# Patient Record
Sex: MALE | Race: WHITE
[De-identification: names, ages, dates, MRNs, and addresses within clinical notes are randomized per-mention and may not be internally consistent; named-entity substitution may affect disease eponyms.]

---

## 2018-10-25 ENCOUNTER — HOSPITAL ENCOUNTER (INPATIENT)
Dept: HOSPITAL 74 - YASAS | Age: 28
LOS: 5 days | Discharge: OTHER INSTITUTION | DRG: 773 | End: 2018-10-30
Attending: INTERNAL MEDICINE | Admitting: INTERNAL MEDICINE
Payer: COMMERCIAL

## 2018-10-25 VITALS — BODY MASS INDEX: 26.7 KG/M2

## 2018-10-25 DIAGNOSIS — F13.230: Primary | ICD-10-CM

## 2018-10-25 DIAGNOSIS — J44.9: ICD-10-CM

## 2018-10-25 DIAGNOSIS — R56.9: ICD-10-CM

## 2018-10-25 DIAGNOSIS — R82.90: ICD-10-CM

## 2018-10-25 DIAGNOSIS — F17.210: ICD-10-CM

## 2018-10-25 DIAGNOSIS — I95.9: ICD-10-CM

## 2018-10-25 DIAGNOSIS — F11.20: ICD-10-CM

## 2018-10-25 DIAGNOSIS — F41.9: ICD-10-CM

## 2018-10-25 LAB
APPEARANCE UR: (no result)
BILIRUB UR STRIP.AUTO-MCNC: NEGATIVE MG/DL
CAOX CRY URNS QL MICRO: (no result) /HPF
COLOR UR: (no result)
EPITH CASTS URNS QL MICRO: (no result) /HPF
HYALINE CASTS URNS QL MICRO: 4 /LPF
KETONES UR QL STRIP: (no result)
LEUKOCYTE ESTERASE UR QL STRIP.AUTO: (no result)
MUCOUS THREADS URNS QL MICRO: (no result)
NITRITE UR QL STRIP: NEGATIVE
PH UR: 5 [PH] (ref 5–8)
PROT UR QL STRIP: (no result)
PROT UR QL STRIP: NEGATIVE
SP GR UR: 1.02 (ref 1.01–1.03)
UROBILINOGEN UR STRIP-MCNC: 2 MG/DL (ref 0.2–1)

## 2018-10-25 PROCEDURE — HZ2ZZZZ DETOXIFICATION SERVICES FOR SUBSTANCE ABUSE TREATMENT: ICD-10-PCS | Performed by: INTERNAL MEDICINE

## 2018-10-25 RX ADMIN — Medication PRN MG: at 22:14

## 2018-10-25 RX ADMIN — Medication SCH MG: at 22:14
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
## 2018-10-26 LAB
ALBUMIN SERPL-MCNC: 3.2 G/DL (ref 3.4–5)
ALP SERPL-CCNC: 67 U/L (ref 45–117)
ALT SERPL-CCNC: 28 U/L (ref 13–61)
ANION GAP SERPL CALC-SCNC: 4 MMOL/L (ref 8–16)
AST SERPL-CCNC: 21 U/L (ref 15–37)
BILIRUB SERPL-MCNC: 0.2 MG/DL (ref 0.2–1)
BUN SERPL-MCNC: 7 MG/DL (ref 7–18)
CALCIUM SERPL-MCNC: 8.6 MG/DL (ref 8.5–10.1)
CHLORIDE SERPL-SCNC: 105 MMOL/L (ref 98–107)
CO2 SERPL-SCNC: 33 MMOL/L (ref 21–32)
CREAT SERPL-MCNC: 0.8 MG/DL (ref 0.55–1.3)
DEPRECATED RDW RBC AUTO: 13.7 % (ref 11.9–15.9)
GLUCOSE SERPL-MCNC: 89 MG/DL (ref 74–106)
HCT VFR BLD CALC: 39.9 % (ref 35.4–49)
HGB BLD-MCNC: 13.6 GM/DL (ref 11.7–16.9)
MCH RBC QN AUTO: 29.2 PG (ref 25.7–33.7)
MCHC RBC AUTO-ENTMCNC: 34.2 G/DL (ref 32–35.9)
MCV RBC: 85.4 FL (ref 80–96)
PLATELET # BLD AUTO: 174 K/MM3 (ref 134–434)
PMV BLD: 10.8 FL (ref 7.5–11.1)
POTASSIUM SERPLBLD-SCNC: 4 MMOL/L (ref 3.5–5.1)
PROT SERPL-MCNC: 6.2 G/DL (ref 6.4–8.2)
RBC # BLD AUTO: 4.67 M/MM3 (ref 4–5.6)
SODIUM SERPL-SCNC: 142 MMOL/L (ref 136–145)
WBC # BLD AUTO: 5.1 K/MM3 (ref 4–10)

## 2018-10-26 RX ADMIN — Medication SCH MG: at 22:16

## 2018-10-26 RX ADMIN — Medication SCH TAB: at 10:08

## 2018-10-26 RX ADMIN — METHADONE HYDROCHLORIDE SCH MG: 40 TABLET ORAL at 08:58

## 2018-10-26 RX ADMIN — Medication PRN MG: at 22:17

## 2018-10-26 NOTE — PN
[facility] CIWA





- CIWA Score


Nausea/Vomiting: 3


Muscle Tremors: 4-Moderate,w/Arms Extend


Anxiety: 4-Mod. Anxious/Guarded


Agitation: 3


Paroxysmal Sweats: 3


Orientation: 0-Oriented


Tacttile Disturbances: 0-None


Auditory Disturbances: 0-None


Visual Disturbances: 0-None


Headache: 0-None Present


CIWA-Ar Total Score: 17





BHS Progress Note (SOAP)


Subjective: 





Sweating, irritable, interrupted sleep


Objective: 





10/26/18 14:54


 Last Vital Signs











Temp Pulse Resp BP Pulse Ox


 


 97.7 F   68   18   94/61    


 


 10/26/18 13:57  10/26/18 13:57  10/26/18 13:57  10/26/18 13:57   








Hypotension noted





 Laboratory Tests











  10/25/18 10/26/18 10/26/18





  16:09 07:00 07:00


 


WBC   5.1 


 


RBC   4.67 


 


Hgb   13.6 


 


Hct   39.9 


 


MCV   85.4 


 


MCH   29.2 


 


MCHC   34.2 


 


RDW   13.7 


 


Plt Count   174 


 


MPV   10.8 


 


Sodium    142


 


Potassium    4.0


 


Chloride    105


 


Carbon Dioxide    33 H


 


Anion Gap    4 L


 


BUN    7


 


Creatinine    0.8


 


Creat Clearance w eGFR    > 60


 


Random Glucose    89


 


Calcium    8.6


 


Total Bilirubin    0.2


 


AST    21


 


ALT    28


 


Alkaline Phosphatase    67


 


Total Protein    6.2 L


 


Albumin    3.2 L


 


Urine Color  Cristy  


 


Urine Appearance  Slcloudy  


 


Urine pH  5.0  


 


Ur Specific Gravity  1.025  


 


Urine Protein  1+ H  


 


Urine Glucose (UA)  Negative  


 


Urine Ketones  Trace H  


 


Urine Blood  Negative  


 


Urine Nitrite  Negative  


 


Urine Bilirubin  Negative  


 


Urine Urobilinogen  2.0  


 


Ur Leukocyte Esterase  Trace  


 


Urine WBC (Auto)  None  


 


Urine RBC (Auto)  None  


 


Ur Epithelial Cells  Rare  


 


Calcium Oxalate Crystal  Few  


 


Hyaline Casts  4  


 


Urine Mucus  Many  


 


RPR Titer   














  10/26/18





  07:00


 


WBC 


 


RBC 


 


Hgb 


 


Hct 


 


MCV 


 


MCH 


 


MCHC 


 


RDW 


 


Plt Count 


 


MPV 


 


Sodium 


 


Potassium 


 


Chloride 


 


Carbon Dioxide 


 


Anion Gap 


 


BUN 


 


Creatinine 


 


Creat Clearance w eGFR 


 


Random Glucose 


 


Calcium 


 


Total Bilirubin 


 


AST 


 


ALT 


 


Alkaline Phosphatase 


 


Total Protein 


 


Albumin 


 


Urine Color 


 


Urine Appearance 


 


Urine pH 


 


Ur Specific Gravity 


 


Urine Protein 


 


Urine Glucose (UA) 


 


Urine Ketones 


 


Urine Blood 


 


Urine Nitrite 


 


Urine Bilirubin 


 


Urine Urobilinogen 


 


Ur Leukocyte Esterase 


 


Urine WBC (Auto) 


 


Urine RBC (Auto) 


 


Ur Epithelial Cells 


 


Calcium Oxalate Crystal 


 


Hyaline Casts 


 


Urine Mucus 


 


RPR Titer  Nonreactive








Labs reviewed: abnormal UA


Assessment: 





10/26/18 14:56


Withdrawal symptoms





Noted with hypotension and abnormal UA


Plan: 





Continue detox





Hypotension: asymptomatic, encouraged PO water intake





Abnormal UA: encouraged PO water intake, repeat UA

## 2018-10-27 RX ADMIN — Medication SCH MG: at 22:21

## 2018-10-27 RX ADMIN — Medication SCH TAB: at 10:31

## 2018-10-27 RX ADMIN — Medication PRN MG: at 22:23

## 2018-10-27 RX ADMIN — METHADONE HYDROCHLORIDE SCH MG: 40 TABLET ORAL at 05:59

## 2018-10-27 NOTE — PN
[facility] CIWA





- CIWA Score


Nausea/Vomitin-No Nausea/No Vomiting


Muscle Tremors: 2


Anxiety: 2


Agitation: 3


Paroxysmal Sweats: 2


Orientation: 0-Oriented


Tacttile Disturbances: 2-Mild Itch/Numbness/Burn


Auditory Disturbances: 0-None


Visual Disturbances: 2-Mild Sensitivity


Headache: 0-None Present


CIWA-Ar Total Score: 13





BHS Progress Note (SOAP)


Subjective: 





c/o constipation, sweats, chills, foot pain, sensitivity to light 


Objective: 





10/27/18 14:33


 Vital Signs











Temperature  98.3 F   10/27/18 14:07


 


Pulse Rate  76   10/27/18 14:07


 


Respiratory Rate  20   10/27/18 14:07


 


Blood Pressure  102/64   10/27/18 14:07


 


O2 Sat by Pulse Oximetry (%)      








 Laboratory Last Values











WBC  5.1 K/mm3 (4.0-10.0)   10/26/18  07:00    


 


RBC  4.67 M/mm3 (4.00-5.60)   10/26/18  07:00    


 


Hgb  13.6 GM/dL (11.7-16.9)   10/26/18  07:00    


 


Hct  39.9 % (35.4-49)   10/26/18  07:00    


 


MCV  85.4 fl (80-96)   10/26/18  07:00    


 


MCH  29.2 pg (25.7-33.7)   10/26/18  07:00    


 


MCHC  34.2 g/dl (32.0-35.9)   10/26/18  07:00    


 


RDW  13.7 % (11.9-15.9)   10/26/18  07:00    


 


Plt Count  174 K/MM3 (134-434)   10/26/18  07:00    


 


MPV  10.8 fl (7.5-11.1)   10/26/18  07:00    


 


Sodium  142 mmol/L (136-145)   10/26/18  07:00    


 


Potassium  4.0 mmol/L (3.5-5.1)   10/26/18  07:00    


 


Chloride  105 mmol/L ()   10/26/18  07:00    


 


Carbon Dioxide  33 mmol/L (21-32)  H  10/26/18  07:00    


 


Anion Gap  4 MMOL/L (8-16)  L  10/26/18  07:00    


 


BUN  7 mg/dL (7-18)   10/26/18  07:00    


 


Creatinine  0.8 mg/dL (0.55-1.3)   10/26/18  07:00    


 


Creat Clearance w eGFR  > 60  (>60)   10/26/18  07:00    


 


Random Glucose  89 mg/dL ()   10/26/18  07:00    


 


Calcium  8.6 mg/dL (8.5-10.1)   10/26/18  07:00    


 


Total Bilirubin  0.2 mg/dL (0.2-1)   10/26/18  07:00    


 


AST  21 U/L (15-37)   10/26/18  07:00    


 


ALT  28 U/L (13-61)   10/26/18  07:00    


 


Alkaline Phosphatase  67 U/L ()   10/26/18  07:00    


 


Total Protein  6.2 g/dl (6.4-8.2)  L  10/26/18  07:00    


 


Albumin  3.2 g/dl (3.4-5.0)  L  10/26/18  07:00    


 


Urine Color  Cristy   10/25/18  16:09    


 


Urine Appearance  Slcloudy   10/25/18  16:09    


 


Urine pH  5.0  (5.0-8.0)   10/25/18  16:09    


 


Ur Specific Gravity  1.025  (1.010-1.035)   10/25/18  16:09    


 


Urine Protein  1+  (NEGATIVE)  H  10/25/18  16:09    


 


Urine Glucose (UA)  Negative  (NEGATIVE)   10/25/18  16:09    


 


Urine Ketones  Trace  (NEGATIVE)  H  10/25/18  16:09    


 


Urine Blood  Negative  (NEGATIVE)   10/25/18  16:09    


 


Urine Nitrite  Negative  (NEGATIVE)   10/25/18  16:09    


 


Urine Bilirubin  Negative  (<2.0 mg/dL)   10/25/18  16:09    


 


Urine Urobilinogen  2.0 mg/dL (0.2-1.0)   10/25/18  16:09    


 


Ur Leukocyte Esterase  Trace  (NEGATIVE)   10/25/18  16:09    


 


Urine WBC (Auto)  None /hpf (3-5)   10/25/18  16:09    


 


Urine RBC (Auto)  None /hpf (0-3)   10/25/18  16:09    


 


Ur Epithelial Cells  Rare /HPF (FEW)   10/25/18  16:09    


 


Calcium Oxalate Crystal  Few /hpf (NONE SEEN)   10/25/18  16:09    


 


Hyaline Casts  4 /lpf  10/25/18  16:09    


 


Urine Mucus  Many   10/25/18  16:09    


 


RPR Titer  Nonreactive  (NONREACTIVE)   10/26/18  07:00    








Ao x 3 no distress


no adventitious breath sounds 


full ROM ambulating in the unit 





Assessment: 





10/27/18 14:34


withdrawal sx 


Plan: 





increase fluids 


continue detox


continue to monitor

## 2018-10-27 NOTE — PN
BHS Progress Note


Note: 





positive ppd,ches xray ordered on Mon 10/29/18 at 0900 before discharge

## 2018-10-28 RX ADMIN — Medication SCH: at 22:09

## 2018-10-28 RX ADMIN — Medication SCH: at 10:24

## 2018-10-28 RX ADMIN — Medication PRN MG: at 22:09

## 2018-10-28 RX ADMIN — METHADONE HYDROCHLORIDE SCH MG: 40 TABLET ORAL at 05:55

## 2018-10-28 NOTE — PN
BHS Progress Note (SOAP)


Subjective: 





Sweating, interrupted sleep


Objective: 





10/28/18 16:30


 Last Vital Signs











Temp Pulse Resp BP Pulse Ox


 


 98.0 F   69   16   105/70    


 


 10/28/18 14:18  10/28/18 14:18  10/28/18 14:18  10/28/18 14:18   








 Laboratory Tests











  10/25/18 10/26/18 10/26/18





  16:09 07:00 07:00


 


WBC   5.1 


 


RBC   4.67 


 


Hgb   13.6 


 


Hct   39.9 


 


MCV   85.4 


 


MCH   29.2 


 


MCHC   34.2 


 


RDW   13.7 


 


Plt Count   174 


 


MPV   10.8 


 


Sodium    142


 


Potassium    4.0


 


Chloride    105


 


Carbon Dioxide    33 H


 


Anion Gap    4 L


 


BUN    7


 


Creatinine    0.8


 


Creat Clearance w eGFR    > 60


 


Random Glucose    89


 


Calcium    8.6


 


Total Bilirubin    0.2


 


AST    21


 


ALT    28


 


Alkaline Phosphatase    67


 


Total Protein    6.2 L


 


Albumin    3.2 L


 


Urine Color  Cristy  


 


Urine Appearance  Slcloudy  


 


Urine pH  5.0  


 


Ur Specific Gravity  1.025  


 


Urine Protein  1+ H  


 


Urine Glucose (UA)  Negative  


 


Urine Ketones  Trace H  


 


Urine Blood  Negative  


 


Urine Nitrite  Negative  


 


Urine Bilirubin  Negative  


 


Urine Urobilinogen  2.0  


 


Ur Leukocyte Esterase  Trace  


 


Urine WBC (Auto)  None  


 


Urine RBC (Auto)  None  


 


Ur Epithelial Cells  Rare  


 


Calcium Oxalate Crystal  Few  


 


Hyaline Casts  4  


 


Urine Mucus  Many  


 


RPR Titer   














  10/26/18





  07:00


 


WBC 


 


RBC 


 


Hgb 


 


Hct 


 


MCV 


 


MCH 


 


MCHC 


 


RDW 


 


Plt Count 


 


MPV 


 


Sodium 


 


Potassium 


 


Chloride 


 


Carbon Dioxide 


 


Anion Gap 


 


BUN 


 


Creatinine 


 


Creat Clearance w eGFR 


 


Random Glucose 


 


Calcium 


 


Total Bilirubin 


 


AST 


 


ALT 


 


Alkaline Phosphatase 


 


Total Protein 


 


Albumin 


 


Urine Color 


 


Urine Appearance 


 


Urine pH 


 


Ur Specific Gravity 


 


Urine Protein 


 


Urine Glucose (UA) 


 


Urine Ketones 


 


Urine Blood 


 


Urine Nitrite 


 


Urine Bilirubin 


 


Urine Urobilinogen 


 


Ur Leukocyte Esterase 


 


Urine WBC (Auto) 


 


Urine RBC (Auto) 


 


Ur Epithelial Cells 


 


Calcium Oxalate Crystal 


 


Hyaline Casts 


 


Urine Mucus 


 


RPR Titer  Nonreactive








Labs reviewed: abnormal UA (reordered UA for repeat but patient didn't provide 

specimen)


Assessment: 





10/28/18 16:31


Withdrawal symptoms


Plan: 





Continue detox


Encouraged PO water intake

## 2018-10-29 LAB
APPEARANCE UR: CLEAR
BILIRUB UR STRIP.AUTO-MCNC: NEGATIVE MG/DL
COLOR UR: (no result)
KETONES UR QL STRIP: NEGATIVE
LEUKOCYTE ESTERASE UR QL STRIP.AUTO: (no result)
NITRITE UR QL STRIP: NEGATIVE
PH UR: 6 [PH] (ref 5–8)
PROT UR QL STRIP: NEGATIVE
PROT UR QL STRIP: NEGATIVE
SP GR UR: 1.01 (ref 1.01–1.03)
UROBILINOGEN UR STRIP-MCNC: NEGATIVE MG/DL (ref 0.2–1)

## 2018-10-29 RX ADMIN — Medication PRN MG: at 22:16

## 2018-10-29 RX ADMIN — Medication SCH MG: at 22:16

## 2018-10-29 RX ADMIN — Medication SCH TAB: at 10:55

## 2018-10-29 RX ADMIN — METHADONE HYDROCHLORIDE SCH MG: 40 TABLET ORAL at 05:23

## 2018-10-29 NOTE — PN
BHS Progress Note (SOAP)


Subjective: 





Sweating, irritation to light and sound as per patient, interrupted sleep, poor 

appetite


Objective: 





10/29/18 14:35


 Last Vital Signs











Temp Pulse Resp BP Pulse Ox


 


 98 F   74   20   93/55 L   


 


 10/29/18 13:38  10/29/18 13:38  10/29/18 13:38  10/29/18 13:38   








Hypotension (b/p 93/55)





 Laboratory Tests











  10/25/18 10/26/18 10/26/18





  16:09 07:00 07:00


 


WBC   5.1 


 


RBC   4.67 


 


Hgb   13.6 


 


Hct   39.9 


 


MCV   85.4 


 


MCH   29.2 


 


MCHC   34.2 


 


RDW   13.7 


 


Plt Count   174 


 


MPV   10.8 


 


Sodium    142


 


Potassium    4.0


 


Chloride    105


 


Carbon Dioxide    33 H


 


Anion Gap    4 L


 


BUN    7


 


Creatinine    0.8


 


Creat Clearance w eGFR    > 60


 


Random Glucose    89


 


Calcium    8.6


 


Total Bilirubin    0.2


 


AST    21


 


ALT    28


 


Alkaline Phosphatase    67


 


Total Protein    6.2 L


 


Albumin    3.2 L


 


Urine Color  Cristy  


 


Urine Appearance  Slcloudy  


 


Urine pH  5.0  


 


Ur Specific Gravity  1.025  


 


Urine Protein  1+ H  


 


Urine Glucose (UA)  Negative  


 


Urine Ketones  Trace H  


 


Urine Blood  Negative  


 


Urine Nitrite  Negative  


 


Urine Bilirubin  Negative  


 


Urine Urobilinogen  2.0  


 


Ur Leukocyte Esterase  Trace  


 


Urine WBC (Auto)  None  


 


Urine RBC (Auto)  None  


 


Ur Epithelial Cells  Rare  


 


Calcium Oxalate Crystal  Few  


 


Hyaline Casts  4  


 


Urine Mucus  Many  


 


RPR Titer   














  10/26/18 10/28/18





  07:00 12:25


 


WBC  


 


RBC  


 


Hgb  


 


Hct  


 


MCV  


 


MCH  


 


MCHC  


 


RDW  


 


Plt Count  


 


MPV  


 


Sodium  


 


Potassium  


 


Chloride  


 


Carbon Dioxide  


 


Anion Gap  


 


BUN  


 


Creatinine  


 


Creat Clearance w eGFR  


 


Random Glucose  


 


Calcium  


 


Total Bilirubin  


 


AST  


 


ALT  


 


Alkaline Phosphatase  


 


Total Protein  


 


Albumin  


 


Urine Color   Ltyellow


 


Urine Appearance   Clear


 


Urine pH   6.0


 


Ur Specific Gravity   1.014


 


Urine Protein   Negative


 


Urine Glucose (UA)   Negative


 


Urine Ketones   Negative


 


Urine Blood   Negative


 


Urine Nitrite   Negative


 


Urine Bilirubin   Negative


 


Urine Urobilinogen   Negative


 


Ur Leukocyte Esterase   Trace


 


Urine WBC (Auto)   <1


 


Urine RBC (Auto)   <1


 


Ur Epithelial Cells  


 


Calcium Oxalate Crystal  


 


Hyaline Casts  


 


Urine Mucus  


 


RPR Titer  Nonreactive 








Labs reviewed


Assessment: 





10/29/18 14:36


Withdrawal symptoms





Noted with hypotension


Plan: 





Continue detox





Hypotension: asymptomatic, encouraged PO water intake

## 2018-10-30 ENCOUNTER — HOSPITAL ENCOUNTER (INPATIENT)
Dept: HOSPITAL 74 - YASAS | Age: 28
LOS: 2 days | Discharge: LEFT BEFORE DISCHARGE | DRG: 770 | End: 2018-11-01
Attending: PSYCHIATRY & NEUROLOGY | Admitting: PSYCHIATRY & NEUROLOGY
Payer: COMMERCIAL

## 2018-10-30 VITALS — TEMPERATURE: 96.6 F | SYSTOLIC BLOOD PRESSURE: 101 MMHG | DIASTOLIC BLOOD PRESSURE: 68 MMHG | HEART RATE: 82 BPM

## 2018-10-30 VITALS — BODY MASS INDEX: 27 KG/M2

## 2018-10-30 DIAGNOSIS — Z86.69: ICD-10-CM

## 2018-10-30 DIAGNOSIS — J44.9: ICD-10-CM

## 2018-10-30 DIAGNOSIS — F13.20: Primary | ICD-10-CM

## 2018-10-30 DIAGNOSIS — F19.280: ICD-10-CM

## 2018-10-30 DIAGNOSIS — F17.210: ICD-10-CM

## 2018-10-30 DIAGNOSIS — F19.282: ICD-10-CM

## 2018-10-30 DIAGNOSIS — F11.20: ICD-10-CM

## 2018-10-30 PROCEDURE — HZ42ZZZ GROUP COUNSELING FOR SUBSTANCE ABUSE TREATMENT, COGNITIVE-BEHAVIORAL: ICD-10-PCS | Performed by: PSYCHIATRY & NEUROLOGY

## 2018-10-30 RX ADMIN — Medication SCH MG: at 22:05

## 2018-10-30 RX ADMIN — Medication SCH TAB: at 10:27

## 2018-10-30 RX ADMIN — METHADONE HYDROCHLORIDE SCH MG: 40 TABLET ORAL at 05:39

## 2018-10-30 NOTE — DS
BHS Detox Discharge Summary


Admission Date: 


10/25/18





Discharge Date: 10/30/18





- History


Present History: Opioid Dependence, Sedative Dependence, [facility]


Additional Comments: 





Patient scheduled for discharge today. As per patient, he was told by his 

counselor that he will be admitted to [facility] rehab today (if bed available)

. Otherwise, patient will follow up at outpatient program. 


Pertinent Past History: 





Nicotine dependence





Benzo related seizure disorder





Sedative dependence





COPD





Anxiety





Opioid dependence on agonist 





- Physical Exam Results


Vital Signs: 


 Vital Signs











Temperature  97 F L  10/30/18 09:10


 


Pulse Rate  73   10/30/18 09:10


 


Respiratory Rate  20   10/30/18 09:10


 


Blood Pressure  108/64   10/30/18 09:10


 


O2 Sat by Pulse Oximetry (%)      











Pertinent Admission Physical Exam Findings: 





Withdrawal symptoms





 Laboratory Tests











  10/25/18 10/26/18 10/26/18





  16:09 07:00 07:00


 


WBC   5.1 


 


RBC   4.67 


 


Hgb   13.6 


 


Hct   39.9 


 


MCV   85.4 


 


MCH   29.2 


 


MCHC   34.2 


 


RDW   13.7 


 


Plt Count   174 


 


MPV   10.8 


 


Sodium    142


 


Potassium    4.0


 


Chloride    105


 


Carbon Dioxide    33 H


 


Anion Gap    4 L


 


BUN    7


 


Creatinine    0.8


 


Creat Clearance w eGFR    > 60


 


Random Glucose    89


 


Calcium    8.6


 


Total Bilirubin    0.2


 


AST    21


 


ALT    28


 


Alkaline Phosphatase    67


 


Total Protein    6.2 L


 


Albumin    3.2 L


 


Urine Color  Cristy  


 


Urine Appearance  Slcloudy  


 


Urine pH  5.0  


 


Ur Specific Gravity  1.025  


 


Urine Protein  1+ H  


 


Urine Glucose (UA)  Negative  


 


Urine Ketones  Trace H  


 


Urine Blood  Negative  


 


Urine Nitrite  Negative  


 


Urine Bilirubin  Negative  


 


Urine Urobilinogen  2.0  


 


Ur Leukocyte Esterase  Trace  


 


Urine WBC (Auto)  None  


 


Urine RBC (Auto)  None  


 


Ur Epithelial Cells  Rare  


 


Calcium Oxalate Crystal  Few  


 


Hyaline Casts  4  


 


Urine Mucus  Many  


 


RPR Titer   














  10/26/18 10/28/18





  07:00 12:25


 


WBC  


 


RBC  


 


Hgb  


 


Hct  


 


MCV  


 


MCH  


 


MCHC  


 


RDW  


 


Plt Count  


 


MPV  


 


Sodium  


 


Potassium  


 


Chloride  


 


Carbon Dioxide  


 


Anion Gap  


 


BUN  


 


Creatinine  


 


Creat Clearance w eGFR  


 


Random Glucose  


 


Calcium  


 


Total Bilirubin  


 


AST  


 


ALT  


 


Alkaline Phosphatase  


 


Total Protein  


 


Albumin  


 


Urine Color   Ltyellow


 


Urine Appearance   Clear


 


Urine pH   6.0


 


Ur Specific Gravity   1.014


 


Urine Protein   Negative


 


Urine Glucose (UA)   Negative


 


Urine Ketones   Negative


 


Urine Blood   Negative


 


Urine Nitrite   Negative


 


Urine Bilirubin   Negative


 


Urine Urobilinogen   Negative


 


Ur Leukocyte Esterase   Trace


 


Urine WBC (Auto)   <1


 


Urine RBC (Auto)   <1


 


Ur Epithelial Cells  


 


Calcium Oxalate Crystal  


 


Hyaline Casts  


 


Urine Mucus  


 


RPR Titer  Nonreactive 








Labs reviewed





- Treatment


Hospital Course: Detox Protocol Followed, Detoxed Safely, Responded well, 

Discharged Condition Good, Rehab Referral Accepted





- Medication


Discharge Medications: 


Ambulatory Orders





NK [No Known Home Medication]  10/25/18 











- Diagnosis


(1) Seizure


Current Visit: Yes   Status: Chronic   





(2) COPD (chronic obstructive pulmonary disease)


Current Visit: Yes   Status: Chronic   





(3) Anxiety


Current Visit: Yes   Status: Chronic   





(4) Nicotine dependence


Current Visit: Yes   Status: Chronic   





(5) Opioid dependence on agonist therapy


Current Visit: Yes   Status: Acute   





(6) Sedative withdrawal


Current Visit: Yes   Status: Acute   





- AMA


Did Patient Leave Against Medical Advice: No (F/U with your PCP within 1-2 weeks

)
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
## 2018-10-31 VITALS — TEMPERATURE: 97.8 F

## 2018-10-31 RX ADMIN — Medication SCH MG: at 21:16

## 2018-10-31 RX ADMIN — METHADONE HYDROCHLORIDE SCH MG: 40 TABLET ORAL at 06:09

## 2018-10-31 RX ADMIN — HYDROXYZINE PAMOATE PRN MG: 50 CAPSULE ORAL at 21:17

## 2018-10-31 RX ADMIN — Medication SCH: at 10:59

## 2018-10-31 NOTE — PN
[facility] Progress Note


Note: 





Patient complains about having restless leg after taking Belsomra yesterday at 

bedtime. Belsomra discontinued and Trazadone 100 mg po HS ordered

## 2018-11-01 VITALS — HEART RATE: 71 BPM | DIASTOLIC BLOOD PRESSURE: 59 MMHG | SYSTOLIC BLOOD PRESSURE: 100 MMHG

## 2018-11-01 RX ADMIN — HYDROXYZINE PAMOATE PRN MG: 50 CAPSULE ORAL at 10:17

## 2018-11-01 RX ADMIN — Medication SCH TAB: at 10:17

## 2018-11-01 RX ADMIN — METHADONE HYDROCHLORIDE SCH MG: 40 TABLET ORAL at 06:07

## 2018-11-01 RX ADMIN — HYDROXYZINE PAMOATE PRN MG: 50 CAPSULE ORAL at 06:11

## 2018-11-01 NOTE — PN
[facility] Progress Note


Note: 


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 

MAINTENANCE ADMITTED TO REHAB ON 10/30/18. PT COMPLETED DETOX ON [address] ON 10/

30/18. ALERT O X 3. IN NO ACUTE DISTRESS. OOB AMBULATING WITH STEADY GAIT. 


NURSE LADAN REPORTED THAT PT C/O CHEST DISCOMFORT SINCE LAST NIGHT AND EKG WAS 

ORDERED BY PEYTON CAAL AND DONE. PT STILL C/O PAIN TO XYPHOID  AND EPIGASTRIC  

AREA. STATES "I FEEL LIKE SOMEONE PUNCHED ME THERE(PATIENT PUTTING CONSTANT 

PRESSURE/HOLDING  ONTO HIS XYPHOID AND EPIGASTRIC AREA) THERE IS A LUMP THERE 

AND IT'S NOT FROM MY HEART". PT REPORTS THIS PAIN  HAS BEEN A LITTLE LESS THAN 

ONE WEEK AGO. STATES 6/10 WITH MOVEMENT. REPORTS DID NOT SEEK HELP FOR IT 

BEFORE ADMISSION HERE. REPORTS  MINIMAL SOB ON/OFF WHEN FEELING PAIN.  DENIES 

NAUSEA OR VOMITTING.  PT REPORTS NO BOWEL MOVEMENT SINCE MONDAY AND ON 

METHADONE 245 MG PO DAILY, LAST DOSE TODAY. PT HAS A HX ANXIETY,DEPRESSION,COPD

, SEIZURE DISORDER. PT REPORTS HE HAS A PMD, DR. BERNARDO  AT [address] [place] [address] [phone](PT DECLINED ANY CALLS FROM HERE TO PMD FOR 

COORDINATION OF HIS CARE). PT WAS EXPLAINED THE RESULT OF HIS CURRENT CXR OF 10/

30/18 AND THE EKG BUT PT INSISTS THAT SOMETHING IS IN THERE. PT WAS OFFERED TO 

GO TO [facility] ER FOR EVALUATION BUT DECLINED AND WANTS TO GO TO HIS DOCTOR 

INSTEAD. PT WAS SEEN BY NATHANAEL ABERNATHY,CLINICAL SUPERVISOR TO CONVINCE PT TO STAY IN 

TREATMENT BUT PATIENT DECLINED ALL EFFORTS AND SIGNED REFUSAL FOR TREATMENT.











 Vital Signs - 24 hr











  11/01/18 11/01/18 11/01/18





  00:30 03:30 06:40


 


Temperature   97.8 F


 


Pulse Rate   71


 


Respiratory 18 20 18





Rate   


 


Blood Pressure   100/59 L





RESP:PULSE OX:96% ROOM AIR





CXR:NO CHEST PATHOLOGY





CARDIAC; S1 S2


EKG:SINUS BRADYCARDIA,  RATE 58.


OTHERWISE NORMAL ECG





ABDOMEN:SOFT, REPORTS MILD EPIGASTRIC DISCOMFORT.





PLAN:EVALUATE IN ER 


MYLANTA PRN


COLACE 100 MG PO  TID

## 2018-11-01 NOTE — EKG
Test Reason : 

Blood Pressure : ***/*** mmHG

Vent. Rate : 058 BPM     Atrial Rate : 058 BPM

   P-R Int : 138 ms          QRS Dur : 076 ms

    QT Int : 464 ms       P-R-T Axes : 031 028 018 degrees

   QTc Int : 455 ms

 

SINUS BRADYCARDIA

OTHERWISE NORMAL ECG

WHEN COMPARED WITH ECG OF 25-OCT-2018 15:52,

QT HAS LENGTHENED

Confirmed by SAI DEVINE MD ([id number]) on 11/1/2018 3:10:07 PM

 

Referred By:             Confirmed By:SAI DEVINE MD

## 2021-02-16 ENCOUNTER — OUTPATIENT (OUTPATIENT)
Dept: OUTPATIENT SERVICES | Facility: HOSPITAL | Age: 31
LOS: 1 days | Discharge: HOME | End: 2021-02-16

## 2021-03-11 DIAGNOSIS — F11.20 OPIOID DEPENDENCE, UNCOMPLICATED: ICD-10-CM
